# Patient Record
Sex: MALE | Race: WHITE | NOT HISPANIC OR LATINO
[De-identification: names, ages, dates, MRNs, and addresses within clinical notes are randomized per-mention and may not be internally consistent; named-entity substitution may affect disease eponyms.]

---

## 2017-10-30 ENCOUNTER — APPOINTMENT (OUTPATIENT)
Dept: SPINE | Facility: CLINIC | Age: 57
End: 2017-10-30

## 2021-11-12 ENCOUNTER — APPOINTMENT (OUTPATIENT)
Dept: OTOLARYNGOLOGY | Facility: CLINIC | Age: 61
End: 2021-11-12
Payer: COMMERCIAL

## 2021-11-12 VITALS
DIASTOLIC BLOOD PRESSURE: 69 MMHG | SYSTOLIC BLOOD PRESSURE: 123 MMHG | HEART RATE: 91 BPM | WEIGHT: 153 LBS | HEIGHT: 64 IN | BODY MASS INDEX: 26.12 KG/M2

## 2021-11-12 DIAGNOSIS — Z78.9 OTHER SPECIFIED HEALTH STATUS: ICD-10-CM

## 2021-11-12 DIAGNOSIS — Z86.39 PERSONAL HISTORY OF OTHER ENDOCRINE, NUTRITIONAL AND METABOLIC DISEASE: ICD-10-CM

## 2021-11-12 DIAGNOSIS — R68.89 OTHER GENERAL SYMPTOMS AND SIGNS: ICD-10-CM

## 2021-11-12 PROCEDURE — 31231 NASAL ENDOSCOPY DX: CPT

## 2021-11-12 PROCEDURE — 99204 OFFICE O/P NEW MOD 45 MIN: CPT | Mod: 25

## 2021-11-12 RX ORDER — ATORVASTATIN CALCIUM 80 MG/1
TABLET, FILM COATED ORAL
Refills: 0 | Status: ACTIVE | COMMUNITY

## 2021-11-12 NOTE — HISTORY OF PRESENT ILLNESS
[de-identified] : 61 year old female referred by Dr Sharon Guido for nasal congestion. States for the last 2 years feels inside right nostril is swollen  feel there is a blockage, intermittent daily nasal congestion, occasional post nasal drip, at times has to clear throat, and has noticed changes to voice.  Denies dyspnea, snoring, anterior rhinorrhea, sinus pressure or pain, recent sinus infections, fevers, otalgia, dysphagia, odynophagia, choking on liquids or solids. Uses nasal saline at times. Has been present about 1 year that he noted. No fever/chills/night sweats/unintentional weight loss.  He did have a maxillary sinus drainage procedure back in [place] in 1980s for a sinus infection (done at bedside) to aspirate.  Nonsmoker.

## 2021-11-12 NOTE — PHYSICAL EXAM
[de-identified] : r [de-identified] : right dorsal fullness inferior to nasal bone [de-identified] : right nasal passage with superolateral mass at the vestibule, lined with normal appearing mucosa, smooth and soft in consistency [Midline] : trachea located in midline position [Normal] : no rashes

## 2021-11-12 NOTE — PROCEDURE
[FreeTextEntry6] : Afrin and lidocaine were topically sprayed. Flexible scope #2 was used. Right nasal passage with normal inferior, middle and superior turbinates. Right anterior supero-lateral mass that is noted and partially obstruct the nasal cavity. Nasal passage patent with clear middle meatus and sphenoethmoid recess. Left nasal passage with normal inferior, middle and superior turbinates. Nasal passage was patent with clear middle meatus and sphenoethmoid recess. No mucopurulence or polyps appreciated. Nasopharynx clear.  [de-identified] : Afrin 0.05% and lidocaine 4% sprayed into both nasal passages. Flexible scope #2 used. Passed through nasal passage and nasopharynx/oropharynx/hypopharynx clear. Supraglottis normal. Glottis with fully mobile vocal cords without lesions or masses. Visualized subglottis clear. Postcricoid area with mild erythema and  edema. No pooling of secretions.

## 2021-11-12 NOTE — CONSULT LETTER
[Dear  ___] : Dear  [unfilled], [Consult Letter:] : I had the pleasure of evaluating your patient, [unfilled]. [Please see my note below.] : Please see my note below. [Consult Closing:] : Thank you very much for allowing me to participate in the care of this patient.  If you have any questions, please do not hesitate to contact me. [Sincerely,] : Sincerely, [FreeTextEntry3] : Amy Parada MD\par Otolaryngology and Cranial Base Surgery\par Attending Physician - Department of Otolaryngology and Head & Neck Surgery\par [facility]\par \par [employer] [facility]

## 2021-11-12 NOTE — REASON FOR VISIT
[Initial Consultation] : an initial consultation for [FreeTextEntry2] : referred by Dr Sharon Guido for nasal congestion

## 2021-11-12 NOTE — ASSESSMENT
[FreeTextEntry1] : right nasal mass:\par - suspect cyst vs neoplasm\par - recommend max-face CT with contrast and then will call with plan (biopsy or excision in office vs in OR)\par - cont nasal saline for now\par \par dysphonia secondary to reflux/LPRD:\par - given reflux precautions handout for lifestyle changes\par - if still with persistent symptoms would trial PPI x 1 month\par \par

## 2021-11-26 ENCOUNTER — APPOINTMENT (OUTPATIENT)
Dept: CT IMAGING | Facility: IMAGING CENTER | Age: 61
End: 2021-11-26
Payer: COMMERCIAL

## 2021-11-26 ENCOUNTER — OUTPATIENT (OUTPATIENT)
Dept: OUTPATIENT SERVICES | Facility: HOSPITAL | Age: 61
LOS: 1 days | End: 2021-11-26
Payer: COMMERCIAL

## 2021-11-26 DIAGNOSIS — J34.89 OTHER SPECIFIED DISORDERS OF NOSE AND NASAL SINUSES: ICD-10-CM

## 2021-11-26 PROCEDURE — 70487 CT MAXILLOFACIAL W/DYE: CPT

## 2021-11-26 PROCEDURE — 70487 CT MAXILLOFACIAL W/DYE: CPT | Mod: 26

## 2021-12-08 ENCOUNTER — APPOINTMENT (OUTPATIENT)
Dept: OTOLARYNGOLOGY | Facility: CLINIC | Age: 61
End: 2021-12-08
Payer: COMMERCIAL

## 2021-12-08 VITALS
TEMPERATURE: 98 F | BODY MASS INDEX: 26.12 KG/M2 | HEART RATE: 84 BPM | SYSTOLIC BLOOD PRESSURE: 138 MMHG | HEIGHT: 64 IN | WEIGHT: 153 LBS | DIASTOLIC BLOOD PRESSURE: 78 MMHG

## 2021-12-08 DIAGNOSIS — R09.82 POSTNASAL DRIP: ICD-10-CM

## 2021-12-08 DIAGNOSIS — R49.0 DYSPHONIA: ICD-10-CM

## 2021-12-08 DIAGNOSIS — R09.81 NASAL CONGESTION: ICD-10-CM

## 2021-12-08 PROCEDURE — 30100 INTRANASAL BIOPSY: CPT | Mod: RT

## 2021-12-08 PROCEDURE — 99214 OFFICE O/P EST MOD 30 MIN: CPT | Mod: 25

## 2021-12-08 NOTE — HISTORY OF PRESENT ILLNESS
[de-identified] : Patient with a right nasal cavity mass. Dr Parada called his wife with the results of the CT scan and he is here for a biopsy\par He thinks his nasal breathing is a little better in the right nostril. \par He has been doing his saline at night before bed. \par He denies nosebleeds or sinus pressure \par \par Patient admits that his throat is much improved since starting reflux precautions at the last visit. He does not have any pain in the throat and thinks his voice is stronger and more normal.

## 2021-12-08 NOTE — DATA REVIEWED
[de-identified] : CT maxillofacial: right polypoid nasal cavity mass cannot exclude polypoid neoplasm

## 2021-12-08 NOTE — PHYSICAL EXAM
[Midline] : trachea located in midline position [Normal] : no rashes [de-identified] : right lateral nasal wall mass anterior/superior to inferior turbinate

## 2021-12-08 NOTE — END OF VISIT
[FreeTextEntry3] : I saw and examined this patient in person. I have discussed with Yohana Magdaleno, Physician Assistant, in detail the above note and agree with the above assessment and plan of care.\par

## 2021-12-08 NOTE — ASSESSMENT
[FreeTextEntry1] : 62 yo male with right nasal cavity mass; CT maxillofacial confirmed mass and results were discussed\par \par right nasal mass was biopsied today in the office and sent to lab. \par - appeared partially cystic in nature as the mass was soft to palpation and the entire lesion came out easily\par - counseled patient that he may have a small amount of bleeding from the biopsy site and may get some crusting \par - he can continue to use nasal saline 3-4 times a day \par - the area was dressed with a cotton ball soaked with bacitracin and he was advised to remove at home\par - we will call him with the results of the biopsy\par \par follow up 2 weeks

## 2021-12-15 LAB — CORE LAB BIOPSY: NORMAL

## 2021-12-22 ENCOUNTER — APPOINTMENT (OUTPATIENT)
Dept: OTOLARYNGOLOGY | Facility: CLINIC | Age: 61
End: 2021-12-22
Payer: COMMERCIAL

## 2021-12-22 VITALS
HEART RATE: 89 BPM | DIASTOLIC BLOOD PRESSURE: 73 MMHG | TEMPERATURE: 98 F | BODY MASS INDEX: 26.12 KG/M2 | HEIGHT: 64 IN | WEIGHT: 153 LBS | SYSTOLIC BLOOD PRESSURE: 129 MMHG

## 2021-12-22 DIAGNOSIS — J34.89 OTHER SPECIFIED DISORDERS OF NOSE AND NASAL SINUSES: ICD-10-CM

## 2021-12-22 PROCEDURE — 99213 OFFICE O/P EST LOW 20 MIN: CPT | Mod: 25

## 2021-12-22 PROCEDURE — 31231 NASAL ENDOSCOPY DX: CPT

## 2021-12-22 NOTE — HISTORY OF PRESENT ILLNESS
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 Notes improvement in breathing since the biopsy. Occasionally having crusting. Not really using saline or bacitracin regularly.\par

## 2021-12-22 NOTE — ASSESSMENT
[FreeTextEntry1] : right nasal vascular leiomyoma:\par - benign lesion but can recur and margins were not obtained in this biopsy although suspect entire lesionw as removed\par - discussed with Dr. Keating of head and neck and he agrees can observe for now since easy area to monitor clinically and if any recurrence plan for MOHS for complete resection with negative margins\par - f/u 4-6 weeks to recheck

## 2021-12-22 NOTE — PHYSICAL EXAM
[Normal] : no abnormal secretions [de-identified] : right lateral nasal wall crusting was debrided and healing biopsy site noted

## 2021-12-22 NOTE — DATA REVIEWED
[de-identified] : CT maxillofacial: right polypoid nasal cavity mass cannot exclude polypoid neoplasm

## 2021-12-22 NOTE — PROCEDURE
[FreeTextEntry6] : Rigid #8 scope used. Right nasal passage with normal inferior, middle and superior turbinates. Right anterior lateral supero nasal passage with crusting near hair bearing skin. This crusting debrided to reveal granulating biopsy site. Flat without obvious residual lesion. PHOTOS TAKEN.

## 2022-03-11 ENCOUNTER — APPOINTMENT (OUTPATIENT)
Dept: OTOLARYNGOLOGY | Facility: CLINIC | Age: 62
End: 2022-03-11
Payer: COMMERCIAL

## 2022-03-11 VITALS
DIASTOLIC BLOOD PRESSURE: 71 MMHG | SYSTOLIC BLOOD PRESSURE: 124 MMHG | BODY MASS INDEX: 26.12 KG/M2 | TEMPERATURE: 97.7 F | HEART RATE: 84 BPM | WEIGHT: 153 LBS | HEIGHT: 64 IN

## 2022-03-11 DIAGNOSIS — J34.89 OTHER SPECIFIED DISORDERS OF NOSE AND NASAL SINUSES: ICD-10-CM

## 2022-03-11 DIAGNOSIS — D21.9 BENIGN NEOPLASM OF CONNECTIVE AND OTHER SOFT TISSUE, UNSPECIFIED: ICD-10-CM

## 2022-03-11 PROCEDURE — 99214 OFFICE O/P EST MOD 30 MIN: CPT

## 2022-03-11 NOTE — HISTORY OF PRESENT ILLNESS
[de-identified] : 60 y/o M with Rt. Nasal ala Vascular leiomyoma.  Removed as entire solid mass so suspect complete resection.  Feels good.  Does not think it has returned.  Occasionally gets some crusting.  No bleeding.

## 2022-03-11 NOTE — PHYSICAL EXAM
[Normal] : mucosa is normal [Midline] : trachea located in midline position [de-identified] : Mild crusting superior lateral nasal wall near inferior edge of upper lateral cartilage, mucosa with small crust that was removed revealing slight scarring with irregular healing but no evidence of nasal mass on visible inspection and none palpated

## 2022-03-11 NOTE — ASSESSMENT
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 RICO CAMPOS in detail this visit today. I personally reviewed the HPI, PMH, FH, SH, ROS and medications/allergies. I have spoken to MIKE Black regarding the history and have personally determined the assessment and plan of care, and documented this myself. I was present and participated in all key portions of the encounter and all procedures noted above. I have made changes in the body of the note where appropriate.\par \par Attesting Faculty: See Attending Signature Below

## 2022-05-11 ENCOUNTER — APPOINTMENT (OUTPATIENT)
Dept: ORTHOPEDIC SURGERY | Facility: CLINIC | Age: 62
End: 2022-05-11

## 2022-05-11 VITALS
HEART RATE: 94 BPM | WEIGHT: 155 LBS | BODY MASS INDEX: 24.91 KG/M2 | DIASTOLIC BLOOD PRESSURE: 74 MMHG | SYSTOLIC BLOOD PRESSURE: 142 MMHG | HEIGHT: 66 IN

## 2022-05-11 DIAGNOSIS — M79.89 OTHER SPECIFIED SOFT TISSUE DISORDERS: ICD-10-CM

## 2022-05-11 PROCEDURE — 99203 OFFICE O/P NEW LOW 30 MIN: CPT

## 2022-05-11 NOTE — PHYSICAL EXAM
[FreeTextEntry1] : Physical exam revealed a healthy looking patient in no apparent distress patient appears to be fully alert oriented examination of the left axillary area and demonstrated a large subcutaneous ecchymosis with a solid fullness over the lower part of the axillary fossa along the tendon of the latissimus dorsi.  At this stage and the possibility of abrupt ruptured hematoma over possible soft tissue sarcoma could not be ruled out.  At this point arrangements are being made for the patient to undergo a ultrasound guided biopsy the need for further evaluation and treatment as needed will be done following the biopsy.

## 2022-05-11 NOTE — REASON FOR VISIT
[FreeTextEntry1] : Presented for evaluation of a large deeply seated soft tissue mass over the left lower axillary region

## 2022-05-20 ENCOUNTER — APPOINTMENT (OUTPATIENT)
Dept: INTERVENTIONAL RADIOLOGY/VASCULAR | Facility: CLINIC | Age: 62
End: 2022-05-20

## 2022-05-27 ENCOUNTER — APPOINTMENT (OUTPATIENT)
Dept: ORTHOPEDIC SURGERY | Facility: CLINIC | Age: 62
End: 2022-05-27

## 2022-07-07 ENCOUNTER — APPOINTMENT (OUTPATIENT)
Dept: ORTHOPEDIC SURGERY | Facility: CLINIC | Age: 62
End: 2022-07-07

## 2022-07-07 VITALS
WEIGHT: 152 LBS | HEIGHT: 65 IN | DIASTOLIC BLOOD PRESSURE: 83 MMHG | SYSTOLIC BLOOD PRESSURE: 128 MMHG | HEART RATE: 81 BPM | BODY MASS INDEX: 25.33 KG/M2

## 2022-07-07 DIAGNOSIS — M76.61 ACHILLES TENDINITIS, RIGHT LEG: ICD-10-CM

## 2022-07-07 PROCEDURE — 99203 OFFICE O/P NEW LOW 30 MIN: CPT

## 2022-07-07 RX ORDER — MELOXICAM 15 MG/1
15 TABLET ORAL DAILY
Qty: 30 | Refills: 0 | Status: ACTIVE | COMMUNITY
Start: 2022-07-07 | End: 1900-01-01

## 2022-09-16 ENCOUNTER — APPOINTMENT (OUTPATIENT)
Dept: OTOLARYNGOLOGY | Facility: CLINIC | Age: 62
End: 2022-09-16

## 2024-09-12 NOTE — HISTORY OF PRESENT ILLNESS
Brittney Mcwilliams is calling to request a refill on the following medication(s):    Medication Request:  Requested Prescriptions     Pending Prescriptions Disp Refills    escitalopram (LEXAPRO) 20 MG tablet [Pharmacy Med Name: ESCITALOPRAM 20MG TABLETS] 30 tablet 0     Sig: TAKE 1 TABLET BY MOUTH EVERY DAY       Last Visit Date (If Applicable):  6/6/2023    Next Visit Date:    Visit date not found   [FreeTextEntry1] : This is the first visit of a 61 years old male over the last 1 months patient noted to have a localized pain tenderness over the left a axillary fossa area in the last the week the mass seems to increase in size producing localized ecchymosis there was no history of trauma or injury.  Otherwise no underlying a any conditions.  Recent MRI scan of the lower axillary area demonstrate a soft tissue mass of undetermined etiology

## 2024-11-11 ENCOUNTER — NON-APPOINTMENT (OUTPATIENT)
Age: 64
End: 2024-11-11

## 2024-11-11 ENCOUNTER — APPOINTMENT (OUTPATIENT)
Dept: OTOLARYNGOLOGY | Facility: CLINIC | Age: 64
End: 2024-11-11
Payer: COMMERCIAL

## 2024-11-11 VITALS
HEART RATE: 82 BPM | TEMPERATURE: 98.2 F | HEIGHT: 65 IN | DIASTOLIC BLOOD PRESSURE: 78 MMHG | BODY MASS INDEX: 25.33 KG/M2 | SYSTOLIC BLOOD PRESSURE: 128 MMHG | WEIGHT: 152 LBS | OXYGEN SATURATION: 97 %

## 2024-11-11 DIAGNOSIS — J34.89 OTHER SPECIFIED DISORDERS OF NOSE AND NASAL SINUSES: ICD-10-CM

## 2024-11-11 PROCEDURE — 31231 NASAL ENDOSCOPY DX: CPT

## 2024-11-11 PROCEDURE — 99213 OFFICE O/P EST LOW 20 MIN: CPT | Mod: 25
